# Patient Record
Sex: MALE | Race: WHITE | Employment: UNEMPLOYED | URBAN - METROPOLITAN AREA
[De-identification: names, ages, dates, MRNs, and addresses within clinical notes are randomized per-mention and may not be internally consistent; named-entity substitution may affect disease eponyms.]

---

## 2020-09-14 ENCOUNTER — OFFICE VISIT (OUTPATIENT)
Dept: INTERNAL MEDICINE CLINIC | Age: 20
End: 2020-09-14
Payer: COMMERCIAL

## 2020-09-14 VITALS
HEART RATE: 78 BPM | RESPIRATION RATE: 12 BRPM | OXYGEN SATURATION: 97 % | BODY MASS INDEX: 31.37 KG/M2 | TEMPERATURE: 97.4 F | WEIGHT: 257.6 LBS | SYSTOLIC BLOOD PRESSURE: 117 MMHG | DIASTOLIC BLOOD PRESSURE: 78 MMHG | HEIGHT: 76 IN

## 2020-09-14 DIAGNOSIS — E66.9 OBESITY (BMI 30-39.9): ICD-10-CM

## 2020-09-14 DIAGNOSIS — Z00.00 HEALTHCARE MAINTENANCE: Primary | ICD-10-CM

## 2020-09-14 DIAGNOSIS — F41.9 ANXIETY AND DEPRESSION: ICD-10-CM

## 2020-09-14 DIAGNOSIS — F32.A ANXIETY AND DEPRESSION: ICD-10-CM

## 2020-09-14 PROCEDURE — 99203 OFFICE O/P NEW LOW 30 MIN: CPT | Performed by: INTERNAL MEDICINE

## 2020-09-14 RX ORDER — SERTRALINE HYDROCHLORIDE 100 MG/1
100 TABLET, FILM COATED ORAL DAILY
COMMUNITY
Start: 2020-07-11 | End: 2020-09-14 | Stop reason: SDUPTHER

## 2020-09-14 RX ORDER — SERTRALINE HYDROCHLORIDE 100 MG/1
100 TABLET, FILM COATED ORAL DAILY
Qty: 90 TAB | Refills: 2 | Status: SHIPPED | OUTPATIENT
Start: 2020-09-14 | End: 2021-10-09

## 2020-09-14 NOTE — PROGRESS NOTES
Delano Cardenas presents today for   Chief Complaint   Patient presents with    Establish Care    Anxiety    Depression       Is someone accompanying this pt? No     Is the patient using any DME equipment during OV? No   Depression Screening:  3 most recent PHQ Screens 9/14/2020   PHQ Not Done Active Diagnosis of Depression or Bipolar Disorder   Little interest or pleasure in doing things Not at all   Feeling down, depressed, irritable, or hopeless Not at all   Total Score PHQ 2 0       Learning Assessment:  Learning Assessment 9/14/2020   PRIMARY LEARNER Patient   HIGHEST LEVEL OF EDUCATION - PRIMARY LEARNER  GRADUATED HIGH SCHOOL OR GED   PRIMARY LANGUAGE ENGLISH   LEARNER PREFERENCE PRIMARY DEMONSTRATION   ANSWERED BY patient   RELATIONSHIP SELF         Health Maintenance reviewed and discussed and ordered per Provider. Health Maintenance Due   Topic Date Due    DTaP/Tdap/Td series (1 - Tdap) 08/15/2007    HPV Age 9Y-34Y (1 - Male 2-dose series) 08/15/2011    Flu Vaccine (1) 09/01/2020   . Coordination of Care  1. Have you been to the ER, urgent care clinic since your last visit? Hospitalized since your last visit? no    2. Have you seen or consulted any other health care providers outside of the [address] [place] [address] since your last visit? Include any pap smears or colon screening.  No

## 2020-09-14 NOTE — PROGRESS NOTES
1. Healthcare maintenance  Is currently up-to-date on all of his healthcare maintenance. We did have a discussion regarding the need to do monthly testicular self checks which she will start doing immediately. I have also referred him to a YouTube video which shows the process in depth. 2. Anxiety and depression  Been stable for quite some time he has no HI or SI I will renew his Zoloft  - sertraline (ZOLOFT) 100 mg tablet; Take 1 Tab by mouth daily. Dispense: 90 Tab; Refill: 2    3. Obesity  We discussed the need for him to lose approximately 20 to 30 pounds. He is very big boned and quite tall. His BMI calculation is coming out to be greater than 30. I explained to him the need to start counting carbs and restricting all carbs by approximately 20%. He admits he eats ice cream for dinner and it is his favorite food. He is also to engage in more physical activity    Chief Complaint   Patient presents with    [facility]    Anxiety    Depression        HPI   This is a very pleasant 80-year-old gentleman with a history of anxiety and depression who presents to [facility]. He reports he has been in really good health otherwise has gained quite a bit of weight over the past couple of years. He has no headache vision changes sore throat chest pain palpitations nausea vomiting diarrhea constipation dysuria or change in urinary frequency. He has no problems with his legs or arms and overall reports he is in good health. He just took a job working at the [employer] in [place]. Patient Active Problem List   Diagnosis Code    Anxiety and depression F41.9, F32.9    Obesity (BMI 30-39. 9) E66.9        Meds:   Sertraline 100mg daily    ROS  - GEN: + gain of weight, no fevers or chills  - HEENT: no vision changes, no tinnitus, no sore throat  - CV: no cp, palpitations or edema  - RESP: no sob, cough  - ABD: no n/v/d, no blood in stool  - : no dysuria or changes in freq.   - SKIN: no rashes, ulcers  - Neuro: no resting tremors, parasthesia in extremities, no headaches  - MS: No weakness in extremities, no gait abnormalities  - Psych: negative for depression or anxiety      Visit Vitals  /78 (BP 1 Location: Left arm, BP Patient Position: Sitting)   Pulse 78   Temp 97.4 °F (36.3 °C) (Temporal)   Resp 12   Ht 6' 4\" (1.93 m)   Wt 257 lb 9.6 oz (116.8 kg)   SpO2 97%   BMI 31.36 kg/m²           Physical Exam  Constitutional:       Appearance: Normal appearance. overweight. NAD and pleasant  HENT:      Head: Normocephalic. Nose: Nose normal.      Mouth/Throat:      Mouth: Mucous membranes are moist. Throat not inflammed  Eyes:      Extraocular Movements: Extraocular movements intact. Conjunctiva/sclera: Conjunctivae normal. Sclera anicteric     Pupils: Pupils are equal, round, and reactive to light. Cardiovascular:      Rate and Rhythm: Normal rate and regular rhythm. Pulses: Normal pulses. Pulmonary:      Effort: No respiratory distress. Breath sounds: CTAB and No stridor. No rhonchi. Abdominal:      General: There is no distension. NT, ND  Neurological:      Mental Status: patient is alert and oriented times 3.  No resting tremor, normal gait     Cranial Nerves: cranial nerves grossly intact  Muskuloskeletal     Full ROM in extremities     Normal gait  Skin     Dry without lesions on examined areas, warm to the touch       Deferred  Psychiatry     Calm, normal affect, interacting normally

## 2021-01-07 ENCOUNTER — HOSPITAL ENCOUNTER (OUTPATIENT)
Dept: LAB | Age: 21
Discharge: HOME | End: 2021-01-07
Payer: COMMERCIAL

## 2021-01-07 ENCOUNTER — VIRTUAL VISIT (OUTPATIENT)
Dept: INTERNAL MEDICINE CLINIC | Age: 21
End: 2021-01-07
Payer: COMMERCIAL

## 2021-01-07 DIAGNOSIS — F90.2 ATTENTION DEFICIT HYPERACTIVITY DISORDER (ADHD), COMBINED TYPE: Primary | ICD-10-CM

## 2021-01-07 LAB
AMPHET UR QL SCN: NEGATIVE
BARBITURATES UR QL SCN: NEGATIVE
BENZODIAZ UR QL: NEGATIVE
CANNABINOIDS UR QL SCN: NEGATIVE
COCAINE UR QL SCN: NEGATIVE
METHADONE UR QL: NEGATIVE
OPIATES UR QL: NEGATIVE
OXYCODONE UR QL SCN: NEGATIVE
PCP UR QL: NEGATIVE
PROPOXYPH UR QL: NEGATIVE
TRICYCLICS UR QL: NEGATIVE

## 2021-01-07 PROCEDURE — 80307 DRUG TEST PRSMV CHEM ANLYZR: CPT

## 2021-01-07 PROCEDURE — 99213 OFFICE O/P EST LOW 20 MIN: CPT | Performed by: INTERNAL MEDICINE

## 2021-01-07 RX ORDER — METHYLPHENIDATE HYDROCHLORIDE 10 MG/1
10 CAPSULE, EXTENDED RELEASE ORAL DAILY
Qty: 30 CAP | Refills: 0 | Status: SHIPPED | OUTPATIENT
Start: 2021-01-07 | End: 2021-02-05 | Stop reason: SDUPTHER

## 2021-01-07 NOTE — PROGRESS NOTES
States he used to take some medications for ADHD but hasn't had any in a while. Difficulty concentrating. Antonio Davila presents today for   Chief Complaint   Patient presents with    Medication Refill     needs ADHD medication       Depression Screening:  3 most recent PHQ Screens 1/7/2021   PHQ Not Done -   Little interest or pleasure in doing things Not at all   Feeling down, depressed, irritable, or hopeless Not at all   Total Score PHQ 2 0       Learning Assessment:  Learning Assessment 9/14/2020   PRIMARY LEARNER Patient   HIGHEST LEVEL OF EDUCATION - PRIMARY LEARNER  GRADUATED HIGH SCHOOL OR GED   PRIMARY LANGUAGE ENGLISH   LEARNER PREFERENCE PRIMARY DEMONSTRATION   ANSWERED BY patient   RELATIONSHIP SELF       Health Maintenance reviewed and discussed and ordered per Provider. Health Maintenance Due   Topic Date Due    DTaP/Tdap/Td series (1 - Tdap) 08/15/2007    HPV Age 9Y-34Y (1 - Male 2-dose series) 08/15/2011    Flu Vaccine (1) 09/01/2020   . Coordination of Care:  1. Have you been to the ER, urgent care clinic since your last visit? Hospitalized since your last visit? no  2. Have you seen or consulted any other health care providers outside of the [address] [place] [address] since your last visit? Include any pap smears or colon screening.  no

## 2021-01-07 NOTE — PROGRESS NOTES
I was at my office in [place], [place] while conducting this encounter. The patient is at home. Consent:  Patient and/or HIS/HER healthcare decision maker is aware that this patient-initiated Telehealth encounter is a billable service, with coverage as determined by patient's insurance carrier. Patient is aware that He/She may receive a bill and has provided verbal consent to proceed: Consent has been obtained within past 12 months of the date of this encounter. This virtual visit was conducted via Telephone    1. Attention deficit hyperactivity disorder (ADHD), combined type  The patient has a longstanding history of ADHD and is been off Ritalin for many years. He is starting [employer] and notes he has a very hard time concentrating. He is distracted incredibly easily. His depression has been controlled and he is otherwise doing well. He is more than willing to give a urine sample. I am also going to refer him to psychiatry given that he is now a dual diagnosis patient with both ADHD and depression  - REFERRAL TO PSYCHIATRY  - methylphenidate HCl (Ritalin LA) 10 mg BP50; Take 1 Cap by mouth daily. Max Daily Amount: 10 mg. Dispense: 30 Cap; Refill: 0  - 11-DRUG SCREEN, URINE       Chief Complaint   Patient presents with    Medication Refill     needs ADHD medication        HPI   This is a pleasant 26-year-old gentleman with a history of depression and a remote history of ADHD. He has been on sertraline for quite some time which is done very well for his depression. He is just getting ready to start [employer] and is noted that his ability to concentrate is slowly gotten worse over the past few months. He notes he gets distracted very easily. He denies any drug use and reports when he was a child he took Ritalin but does not recall the dose. He denies any chest pain or palpitations.   He denies any SI or HI  Current Outpatient Medications on File Prior to Visit   Medication Sig Dispense Refill  sertraline (ZOLOFT) 100 mg tablet Take 1 Tab by mouth daily. 90 Tab 2     No current facility-administered medications on file prior to visit. Patient Active Problem List   Diagnosis Code    Anxiety and depression F41.9, F32.9    Obesity (BMI 30-39. 9) E66.9             Total Time Spent on this Encounter: 12 minutes spent

## 2021-02-05 ENCOUNTER — VIRTUAL VISIT (OUTPATIENT)
Dept: INTERNAL MEDICINE CLINIC | Age: 21
End: 2021-02-05
Payer: COMMERCIAL

## 2021-02-05 DIAGNOSIS — F90.2 ATTENTION DEFICIT HYPERACTIVITY DISORDER (ADHD), COMBINED TYPE: Primary | ICD-10-CM

## 2021-02-05 PROCEDURE — 99213 OFFICE O/P EST LOW 20 MIN: CPT | Performed by: INTERNAL MEDICINE

## 2021-02-05 RX ORDER — METHYLPHENIDATE HYDROCHLORIDE 10 MG/1
10 CAPSULE, EXTENDED RELEASE ORAL DAILY
Qty: 30 CAP | Refills: 0 | Status: SHIPPED | OUTPATIENT
Start: 2021-02-05 | End: 2021-02-23 | Stop reason: CLARIF

## 2021-02-05 NOTE — PROGRESS NOTES
Would like generic brand of Ritalin due to cost. UDS was done 1-7-2021. Tiffanie Vega presents today for   Chief Complaint   Patient presents with    Behavioral Problem     follow up    Medication Refill       Depression Screening:  3 most recent PHQ Screens 2/5/2021   PHQ Not Done -   Little interest or pleasure in doing things Not at all   Feeling down, depressed, irritable, or hopeless Not at all   Total Score PHQ 2 0       Learning Assessment:  Learning Assessment 1/7/2021   PRIMARY LEARNER Patient   HIGHEST LEVEL OF EDUCATION - PRIMARY LEARNER  GRADUATED HIGH SCHOOL OR GED   BARRIERS PRIMARY LEARNER NONE   PRIMARY LANGUAGE ENGLISH   LEARNER PREFERENCE PRIMARY DEMONSTRATION   ANSWERED BY patient   RELATIONSHIP SELF       Health Maintenance reviewed and discussed and ordered per Provider. Health Maintenance Due   Topic Date Due    DTaP/Tdap/Td series (1 - Tdap) 08/15/2007    HPV Age 9Y-34Y (1 - Male 2-dose series) 08/15/2011    COVID-19 Vaccine (1 of 2) 08/15/2016    Flu Vaccine (1) 09/01/2020   . Coordination of Care:  1. Have you been to the ER, urgent care clinic since your last visit? Hospitalized since your last visit? no    2. Have you seen or consulted any other health care providers outside of the [address] [place] [address] since your last visit? Include any pap smears or colon screening.  no

## 2021-02-05 NOTE — PROGRESS NOTES
I was at my office in [place], [place] while conducting this encounter. The patient is at home. Consent:  Patient and/or HIS/HER healthcare decision maker is aware that this patient-initiated Telehealth encounter is a billable service, with coverage as determined by patient's insurance carrier. Patient is aware that He/She may receive a bill and has provided verbal consent to proceed: Consent has been obtained within past 12 months of the date of this encounter. This virtual visit was conducted via Telephone    1. Attention deficit hyperactivity disorder (ADHD), combined type  The patient did see a psychiatrist who confirmed his adhd, depression. I told him I would fill this again until he got his followup but going forward, he will have to go through his psychiatryist  - methylphenidate HCl (Ritalin LA) 10 mg BP50; Take 1 Cap by mouth daily. Max Daily Amount: 10 mg. Dispense: 30 Cap; Refill: 0      Chief Complaint   Patient presents with    Behavioral Problem     follow up    Medication Refill        HPI   This is a pleasant 20 yo male who is seen in f/u for his adhd. During our last encounter I sent him to psychiatry for evaluation of his adhd and depression. He had that eval and and his diagnoses have been for him. The patient has no HI or SI. He will be following up with psychiatry going further. He is very excited about starting driving school. Current Outpatient Medications on File Prior to Visit   Medication Sig Dispense Refill    sertraline (ZOLOFT) 100 mg tablet Take 1 Tab by mouth daily. 90 Tab 2     No current facility-administered medications on file prior to visit. Patient Active Problem List   Diagnosis Code    Anxiety and depression F41.9, F32.9    Obesity (BMI 30-39. 9) E66.9             Total Time Spent on this Encounter: 15 minutes spent

## 2021-02-17 ENCOUNTER — TELEPHONE (OUTPATIENT)
Dept: INTERNAL MEDICINE CLINIC | Age: 21
End: 2021-02-17

## 2021-02-23 ENCOUNTER — TELEPHONE (OUTPATIENT)
Dept: INTERNAL MEDICINE CLINIC | Age: 21
End: 2021-02-23

## 2021-02-23 DIAGNOSIS — F90.2 ATTENTION DEFICIT HYPERACTIVITY DISORDER (ADHD), COMBINED TYPE: Primary | ICD-10-CM

## 2021-02-23 RX ORDER — METHYLPHENIDATE HYDROCHLORIDE 10 MG/1
10 TABLET ORAL 2 TIMES DAILY
Qty: 60 TAB | Refills: 0 | Status: SHIPPED | OUTPATIENT
Start: 2021-02-23 | End: 2021-10-28 | Stop reason: ALTCHOICE

## 2021-10-09 DIAGNOSIS — F32.A ANXIETY AND DEPRESSION: ICD-10-CM

## 2021-10-09 DIAGNOSIS — F41.9 ANXIETY AND DEPRESSION: ICD-10-CM

## 2021-10-09 RX ORDER — SERTRALINE HYDROCHLORIDE 100 MG/1
TABLET, FILM COATED ORAL
Qty: 90 TABLET | Refills: 2 | Status: SHIPPED | OUTPATIENT
Start: 2021-10-09

## 2021-10-28 ENCOUNTER — OFFICE VISIT (OUTPATIENT)
Dept: INTERNAL MEDICINE CLINIC | Age: 21
End: 2021-10-28
Payer: COMMERCIAL

## 2021-10-28 VITALS
RESPIRATION RATE: 18 BRPM | WEIGHT: 277.4 LBS | BODY MASS INDEX: 33.78 KG/M2 | DIASTOLIC BLOOD PRESSURE: 75 MMHG | HEIGHT: 76 IN | TEMPERATURE: 98.1 F | SYSTOLIC BLOOD PRESSURE: 138 MMHG | HEART RATE: 84 BPM | OXYGEN SATURATION: 97 %

## 2021-10-28 DIAGNOSIS — K52.9 ENTERITIS: ICD-10-CM

## 2021-10-28 DIAGNOSIS — Z23 ENCOUNTER FOR IMMUNIZATION: Primary | ICD-10-CM

## 2021-10-28 PROCEDURE — 99213 OFFICE O/P EST LOW 20 MIN: CPT | Performed by: INTERNAL MEDICINE

## 2021-10-28 PROCEDURE — 90471 IMMUNIZATION ADMIN: CPT | Performed by: INTERNAL MEDICINE

## 2021-10-28 PROCEDURE — 90686 IIV4 VACC NO PRSV 0.5 ML IM: CPT | Performed by: INTERNAL MEDICINE

## 2021-10-28 NOTE — PROGRESS NOTES
Mihir Isbell is a 24 y.o. male who presents for routine immunizations. He denies any symptoms , reactions or allergies that would exclude them from being immunized today. Risks and adverse reactions were discussed and the VIS was given to them. All questions were addressed. He was observed for 15 min post injection. There were no reactions observed.     Felicia Garcia LPN

## 2021-10-28 NOTE — PROGRESS NOTES
States over the weekend he was having abdominal pain and was unable to eat anything. Had small amount of diarrhea, no fevers, no other symptoms. States he stayed home from work a few days and was covid tested and was negative. Symptoms have subsided. Ashley Tinoco presents today for   Chief Complaint   Patient presents with    Abdominal Pain       Is someone accompanying this pt?no  Is the patient using any DME equipment during OV? no    Depression Screening:  3 most recent PHQ Screens 10/28/2021   PHQ Not Done -   Little interest or pleasure in doing things Not at all   Feeling down, depressed, irritable, or hopeless Not at all   Total Score PHQ 2 0       Learning Assessment:  Learning Assessment 1/7/2021   PRIMARY LEARNER Patient   HIGHEST LEVEL OF EDUCATION - PRIMARY LEARNER  GRADUATED HIGH SCHOOL OR GED   BARRIERS PRIMARY LEARNER NONE   PRIMARY LANGUAGE ENGLISH   LEARNER PREFERENCE PRIMARY DEMONSTRATION   ANSWERED BY patient   RELATIONSHIP SELF         Health Maintenance reviewed and discussed and ordered per Provider. Health Maintenance Due   Topic Date Due    Hepatitis C Screening  Never done    HPV Age 9Y-34Y (3 - Male 2-dose series) Never done    DTaP/Tdap/Td series (1 - Tdap) Never done    Flu Vaccine (1) Never done   . Coordination of Care:  1. \"Have you been to the ER, urgent care clinic since your last visit? Hospitalized since your last visit? \" No    2. \"Have you seen or consulted any other health care providers outside of the [address] [place] [address] since your last visit? \" No

## 2021-10-28 NOTE — PROGRESS NOTES
1. Encounter for immunization  Administer influenza vaccine  - INFLUENZA VIRUS VAC QUAD,SPLIT,PRESV FREE SYRINGE IM    2. Enteritis  This is an acute problem that seems to have nearly resolved. I have encouraged him to drink plenty of fluids    Previous tox screen reviewed  Results for Kim Cullen (MRN [id number]) as of 10/28/2021 16:16   Ref. Range 1/7/2021 17:48   AMPHETAMINES Latest Ref Range: Negative   Negative   BARBITURATES Latest Ref Range: Negative   Negative   BENZODIAZEPINES Latest Ref Range: Negative   Negative   COCAINE Latest Ref Range: Negative   Negative   METHADONE Latest Ref Range: Negative   Negative   OPIATES Latest Ref Range: Negative   Negative   OXYCODONE SCREEN Latest Ref Range: Negative   Negative   PCP(PHENCYCLIDINE) Latest Ref Range: Negative   Negative   PROPOXYPHENE Latest Ref Range: Negative   Negative   THC (TH-CANNABINOL) Latest Ref Range: Negative   Negative   TRICYCLICS Latest Ref Range: Negative   Negative   DRUG SCREEN, URINE Unknown Rpt     Chief Complaint   Patient presents with    Abdominal Pain        HPI   This is a very pleasant 27-year-old gentleman who presents after acute abdominal pain which happened a few days ago. He reports he had a couple episodes of significant diarrhea. The pain was sharp and squeezing in nature right around his navel. Over the past 48 hours, the pain is completely resolved. He had a little bit of nausea without vomiting. His diarrhea is gone. He overall feels great  Patient Active Problem List   Diagnosis Code    Anxiety and depression F41.9, F32. A    Obesity (BMI 30-39. 9) E66.9        Current Outpatient Medications on File Prior to Visit   Medication Sig Dispense Refill    METHYLPHENIDATE PO Take 10 mg by mouth daily.  sertraline (ZOLOFT) 100 mg tablet TAKE 1 TABLET BY MOUTH DAILY 90 Tablet 2     No current facility-administered medications on file prior to visit.         ROS  - GEN: no weight gain/loss, no fevers or chills  - - ABD: no vomitting, + nausea + abd pain  - : no dysuria or changes in freq. Visit Vitals  /75   Pulse 84   Temp 98.1 °F (36.7 °C)   Resp 18   Ht 6' 4\" (1.93 m)   Wt 277 lb 6.4 oz (125.8 kg)   SpO2 97%   BMI 33.77 kg/m²           Physical Exam  Constitutional:       Appearance: Normal appearance. obese. NAD and pleasant  HENT:      Head: Normocephalic. Nose: Nose normal.      Mouth/Throat:      Mouth: Mucous membranes are moist. Throat not inflammed   Abdominal:      General: There is no distension.  NT, ND

## 2022-03-18 PROBLEM — F32.A ANXIETY AND DEPRESSION: Status: ACTIVE | Noted: 2020-09-14

## 2022-03-18 PROBLEM — F41.9 ANXIETY AND DEPRESSION: Status: ACTIVE | Noted: 2020-09-14

## 2022-03-18 PROBLEM — E66.9 OBESITY (BMI 30-39.9): Status: ACTIVE | Noted: 2020-09-14
